# Patient Record
Sex: MALE | Race: OTHER | URBAN - METROPOLITAN AREA
[De-identification: names, ages, dates, MRNs, and addresses within clinical notes are randomized per-mention and may not be internally consistent; named-entity substitution may affect disease eponyms.]

---

## 2018-03-21 ENCOUNTER — EMERGENCY (EMERGENCY)
Facility: HOSPITAL | Age: 53
LOS: 1 days | Discharge: HOME | End: 2018-03-21
Attending: EMERGENCY MEDICINE | Admitting: EMERGENCY MEDICINE
Payer: OTHER MISCELLANEOUS

## 2018-03-21 VITALS
RESPIRATION RATE: 18 BRPM | OXYGEN SATURATION: 99 % | DIASTOLIC BLOOD PRESSURE: 72 MMHG | HEART RATE: 71 BPM | TEMPERATURE: 98 F | SYSTOLIC BLOOD PRESSURE: 158 MMHG

## 2018-03-21 DIAGNOSIS — M54.5 LOW BACK PAIN: ICD-10-CM

## 2018-03-21 DIAGNOSIS — M54.41 LUMBAGO WITH SCIATICA, RIGHT SIDE: ICD-10-CM

## 2018-03-21 DIAGNOSIS — F17.210 NICOTINE DEPENDENCE, CIGARETTES, UNCOMPLICATED: ICD-10-CM

## 2018-03-21 DIAGNOSIS — B20 HUMAN IMMUNODEFICIENCY VIRUS [HIV] DISEASE: ICD-10-CM

## 2018-03-21 PROCEDURE — 99283 EMERGENCY DEPT VISIT LOW MDM: CPT

## 2018-03-21 RX ORDER — DIAZEPAM 5 MG
5 TABLET ORAL ONCE
Qty: 0 | Refills: 0 | Status: DISCONTINUED | OUTPATIENT
Start: 2018-03-21 | End: 2018-03-21

## 2018-03-21 RX ORDER — IBUPROFEN 200 MG
600 TABLET ORAL ONCE
Qty: 0 | Refills: 0 | Status: COMPLETED | OUTPATIENT
Start: 2018-03-21 | End: 2018-03-21

## 2018-03-21 RX ORDER — TRAMADOL HYDROCHLORIDE 50 MG/1
1 TABLET ORAL
Qty: 12 | Refills: 0 | OUTPATIENT
Start: 2018-03-21 | End: 2018-03-23

## 2018-03-21 RX ORDER — IBUPROFEN 200 MG
1 TABLET ORAL
Qty: 30 | Refills: 0 | OUTPATIENT
Start: 2018-03-21

## 2018-03-21 RX ORDER — DIAZEPAM 5 MG
1 TABLET ORAL
Qty: 10 | Refills: 0 | OUTPATIENT
Start: 2018-03-21 | End: 2018-03-25

## 2018-03-21 RX ORDER — DEXAMETHASONE 0.5 MG/5ML
10 ELIXIR ORAL ONCE
Qty: 0 | Refills: 0 | Status: COMPLETED | OUTPATIENT
Start: 2018-03-21 | End: 2018-03-21

## 2018-03-21 RX ORDER — ACETAMINOPHEN 500 MG
975 TABLET ORAL ONCE
Qty: 0 | Refills: 0 | Status: COMPLETED | OUTPATIENT
Start: 2018-03-21 | End: 2018-03-21

## 2018-03-21 RX ADMIN — Medication 5 MILLIGRAM(S): at 13:18

## 2018-03-21 RX ADMIN — Medication 975 MILLIGRAM(S): at 13:18

## 2018-03-21 RX ADMIN — Medication 600 MILLIGRAM(S): at 13:18

## 2018-03-21 RX ADMIN — Medication 10 MILLIGRAM(S): at 13:18

## 2018-03-21 NOTE — ED PROVIDER NOTE - MUSCULOSKELETAL, MLM
Spine appears normal and is not tender, range of motion is not limited, + R sided low back ttp in muscles at ~ L5 level.

## 2018-03-21 NOTE — ED PROVIDER NOTE - OBJECTIVE STATEMENT
52 M her with R sided low back pain and sciatica. Worse with walking. Sometimes feels like his leg is buckling. No midline back pain. Did some stretching with a therapist and it help a bit. Off work today 2/2 snow. Usually works out at the gym with weights and does some cardio. No GIGU sx.

## 2018-03-21 NOTE — ED PROVIDER NOTE - MEDICAL DECISION MAKING DETAILS
Patient presenting with low back pain with sciatica. Seems muscular. will give Decadron/Diazepam and Motrin/APAP and rec bodywork and fu with PMD.

## 2020-03-04 ENCOUNTER — TRANSCRIPTION ENCOUNTER (OUTPATIENT)
Age: 55
End: 2020-03-04

## 2020-06-29 ENCOUNTER — TRANSCRIPTION ENCOUNTER (OUTPATIENT)
Age: 55
End: 2020-06-29

## 2020-08-21 ENCOUNTER — TRANSCRIPTION ENCOUNTER (OUTPATIENT)
Age: 55
End: 2020-08-21

## 2021-03-23 ENCOUNTER — EMERGENCY (EMERGENCY)
Facility: HOSPITAL | Age: 56
LOS: 1 days | Discharge: HOME | End: 2021-03-23
Attending: EMERGENCY MEDICINE | Admitting: EMERGENCY MEDICINE
Payer: COMMERCIAL

## 2021-03-23 VITALS
HEIGHT: 68 IN | TEMPERATURE: 98 F | HEART RATE: 79 BPM | SYSTOLIC BLOOD PRESSURE: 144 MMHG | DIASTOLIC BLOOD PRESSURE: 88 MMHG | RESPIRATION RATE: 18 BRPM | WEIGHT: 169.98 LBS | OXYGEN SATURATION: 98 %

## 2021-03-23 VITALS
TEMPERATURE: 98 F | SYSTOLIC BLOOD PRESSURE: 147 MMHG | OXYGEN SATURATION: 99 % | HEART RATE: 64 BPM | RESPIRATION RATE: 17 BRPM | DIASTOLIC BLOOD PRESSURE: 76 MMHG

## 2021-03-23 DIAGNOSIS — Z20.822 CONTACT WITH AND (SUSPECTED) EXPOSURE TO COVID-19: ICD-10-CM

## 2021-03-23 DIAGNOSIS — H57.12 OCULAR PAIN, LEFT EYE: ICD-10-CM

## 2021-03-23 DIAGNOSIS — L03.213 PERIORBITAL CELLULITIS: ICD-10-CM

## 2021-03-23 DIAGNOSIS — Z21 ASYMPTOMATIC HUMAN IMMUNODEFICIENCY VIRUS [HIV] INFECTION STATUS: ICD-10-CM

## 2021-03-23 PROBLEM — B20 HUMAN IMMUNODEFICIENCY VIRUS [HIV] DISEASE: Chronic | Status: ACTIVE | Noted: 2018-03-21

## 2021-03-23 LAB
ALBUMIN SERPL ELPH-MCNC: 4.2 G/DL — SIGNIFICANT CHANGE UP (ref 3.4–5)
ALP SERPL-CCNC: 80 U/L — SIGNIFICANT CHANGE UP (ref 40–120)
ALT FLD-CCNC: 55 U/L — HIGH (ref 12–42)
ANION GAP SERPL CALC-SCNC: 5 MMOL/L — LOW (ref 9–16)
AST SERPL-CCNC: 35 U/L — SIGNIFICANT CHANGE UP (ref 15–37)
BASOPHILS # BLD AUTO: 0.05 K/UL — SIGNIFICANT CHANGE UP (ref 0–0.2)
BASOPHILS NFR BLD AUTO: 1 % — SIGNIFICANT CHANGE UP (ref 0–2)
BILIRUB SERPL-MCNC: 0.3 MG/DL — SIGNIFICANT CHANGE UP (ref 0.2–1.2)
BUN SERPL-MCNC: 13 MG/DL — SIGNIFICANT CHANGE UP (ref 7–23)
CALCIUM SERPL-MCNC: 9.3 MG/DL — SIGNIFICANT CHANGE UP (ref 8.5–10.5)
CHLORIDE SERPL-SCNC: 107 MMOL/L — SIGNIFICANT CHANGE UP (ref 96–108)
CO2 SERPL-SCNC: 30 MMOL/L — SIGNIFICANT CHANGE UP (ref 22–31)
CREAT SERPL-MCNC: 1 MG/DL — SIGNIFICANT CHANGE UP (ref 0.5–1.3)
EOSINOPHIL # BLD AUTO: 0.19 K/UL — SIGNIFICANT CHANGE UP (ref 0–0.5)
EOSINOPHIL NFR BLD AUTO: 3.7 % — SIGNIFICANT CHANGE UP (ref 0–6)
GLUCOSE SERPL-MCNC: 105 MG/DL — HIGH (ref 70–99)
HCT VFR BLD CALC: 43.1 % — SIGNIFICANT CHANGE UP (ref 39–50)
HGB BLD-MCNC: 15 G/DL — SIGNIFICANT CHANGE UP (ref 13–17)
IMM GRANULOCYTES NFR BLD AUTO: 0.4 % — SIGNIFICANT CHANGE UP (ref 0–1.5)
LYMPHOCYTES # BLD AUTO: 1.09 K/UL — SIGNIFICANT CHANGE UP (ref 1–3.3)
LYMPHOCYTES # BLD AUTO: 21.3 % — SIGNIFICANT CHANGE UP (ref 13–44)
MCHC RBC-ENTMCNC: 33.3 PG — SIGNIFICANT CHANGE UP (ref 27–34)
MCHC RBC-ENTMCNC: 34.8 GM/DL — SIGNIFICANT CHANGE UP (ref 32–36)
MCV RBC AUTO: 95.6 FL — SIGNIFICANT CHANGE UP (ref 80–100)
MONOCYTES # BLD AUTO: 0.61 K/UL — SIGNIFICANT CHANGE UP (ref 0–0.9)
MONOCYTES NFR BLD AUTO: 11.9 % — SIGNIFICANT CHANGE UP (ref 2–14)
NEUTROPHILS # BLD AUTO: 3.15 K/UL — SIGNIFICANT CHANGE UP (ref 1.8–7.4)
NEUTROPHILS NFR BLD AUTO: 61.7 % — SIGNIFICANT CHANGE UP (ref 43–77)
NRBC # BLD: 0 /100 WBCS — SIGNIFICANT CHANGE UP (ref 0–0)
PLATELET # BLD AUTO: 220 K/UL — SIGNIFICANT CHANGE UP (ref 150–400)
POTASSIUM SERPL-MCNC: 4.6 MMOL/L — SIGNIFICANT CHANGE UP (ref 3.5–5.3)
POTASSIUM SERPL-SCNC: 4.6 MMOL/L — SIGNIFICANT CHANGE UP (ref 3.5–5.3)
PROT SERPL-MCNC: 7.7 G/DL — SIGNIFICANT CHANGE UP (ref 6.4–8.2)
RBC # BLD: 4.51 M/UL — SIGNIFICANT CHANGE UP (ref 4.2–5.8)
RBC # FLD: 11.6 % — SIGNIFICANT CHANGE UP (ref 10.3–14.5)
SARS-COV-2 RNA SPEC QL NAA+PROBE: SIGNIFICANT CHANGE UP
SODIUM SERPL-SCNC: 142 MMOL/L — SIGNIFICANT CHANGE UP (ref 132–145)
WBC # BLD: 5.11 K/UL — SIGNIFICANT CHANGE UP (ref 3.8–10.5)
WBC # FLD AUTO: 5.11 K/UL — SIGNIFICANT CHANGE UP (ref 3.8–10.5)

## 2021-03-23 PROCEDURE — 70481 CT ORBIT/EAR/FOSSA W/DYE: CPT | Mod: 26

## 2021-03-23 PROCEDURE — 99285 EMERGENCY DEPT VISIT HI MDM: CPT

## 2021-03-23 RX ORDER — ACETAMINOPHEN 500 MG
975 TABLET ORAL ONCE
Refills: 0 | Status: COMPLETED | OUTPATIENT
Start: 2021-03-23 | End: 2021-03-23

## 2021-03-23 RX ORDER — OFLOXACIN OTIC SOLUTION 3 MG/ML
5 SOLUTION/ DROPS AURICULAR (OTIC)
Qty: 10 | Refills: 0
Start: 2021-03-23 | End: 2021-03-29

## 2021-03-23 RX ORDER — OFLOXACIN 0.3 %
1 DROPS OPHTHALMIC (EYE)
Qty: 15 | Refills: 0
Start: 2021-03-23 | End: 2021-03-29

## 2021-03-23 RX ADMIN — Medication 1 TABLET(S): at 09:40

## 2021-03-23 RX ADMIN — Medication 975 MILLIGRAM(S): at 08:44

## 2021-03-23 NOTE — ED ADULT NURSE REASSESSMENT NOTE - NS ED NURSE REASSESS COMMENT FT1
pt  became diaphoretic, hypotensive, and bradycardic s/p iv insertion, Dr. Khalil made aware, pt placed on cardiac monitor

## 2021-03-23 NOTE — ED PROVIDER NOTE - PATIENT PORTAL LINK FT
You can access the FollowMyHealth Patient Portal offered by [facility] by registering at the following website: [email or web address]. By joining [facility]’s FollowMyHealth portal, you will also be able to view your health information using other applications (apps) compatible with our system.

## 2021-03-23 NOTE — ED PROVIDER NOTE - NSFOLLOWUPINSTRUCTIONS_ED_ALL_ED_FT
Your diagnosis: preseptal cellulitis of the left eye    We performed a CT scan of your left eye which showed eyelid swelling, but no inflammation or infection of your eye or deeper structures.    Discharge instructions:    - Please follow up with an ophthalmologist in the next few days.    - Tylenol up to 650 mg every 8 hours as needed for pain and/or Motrin up to 600 mg every 6 hours as needed for pain. Take any prescribed medications as instructed: augmentin every 12 hours for 7 days. Ofloxacin eye drops every 4 hours for 7 days.    - Be sure to return to the ED if you develop new or worsening symptoms. Specific signs and symptoms to be vigilant of: fever, chills, eye bulging, pain with eye movement, double vision, blurry vision.

## 2021-03-23 NOTE — ED PROVIDER NOTE - NS ED ROS FT
GENERAL: no fever, chills, fatigue, weight loss, night sweats  HEENT: + left eye pain, conjunctivitis, watery discharge, blurry vision,   CARDIAC: no chest pain, palpitations, lightheadedness, syncope  PULM: no dyspnea, wheezing  GI: no abdominal pain, nausea, vomiting, diarrhea, constipation, melena, hematochezia  : no urinary dysuria, frequency, incontinence, hematuria  NEURO: no headache, changes in vision, motor weakness, sensory changes  MSK: no joint pain, joint swelling, myalgias  SKIN: no rashes  HEME: no active bleeding, excessive bruising

## 2021-03-23 NOTE — ED PROVIDER NOTE - CLINICAL SUMMARY MEDICAL DECISION MAKING FREE TEXT BOX
55M with hx of HIV (last checked CD4, viral load 2 weeks prior, normal) presenting with around 1 week of left eye discomfort that progressed to left eyelid swelling, left conjunctivitis associated with watery discharge, blurry vision and discomfort with upper deviation of eye after a an episode of sinusitis that started 2 weeks prior. On exam, VS wnl, afebrile, appears clinically well, diffuse left eyelid swelling, left conjunctivitis with watery discharge, pupils 2mm bilaterally equal and reactive to light, left acuity 20/40, right acuity 20/20, upper deviation of eyes mildly diminished d/t discomfort, full visual fields. Concerning for preseptal cellulitis but cannot r/o orbital cellulitis. Low concern for glaucoma or corneal abrasion. Will check basics, CT orbit with IV contrast, pain control, disposition pending work-up and response to treatment.

## 2021-03-23 NOTE — ED PROVIDER NOTE - OBJECTIVE STATEMENT
55M with hx of HIV (last checked CD4, viral load 2 weeks prior, normal) presenting with around 1 week of left eye discomfort that progressed to left eyelid swelling, left conjunctivitis associated with watery discharge, blurry vision and discomfort with upper deviation of eye after a an episode of sinusitis that started 2 weeks prior. Reports associated mild headache, but denies double vision, fever, chills, nausea, vomiting. No trauma to the eye. Does not remember an inciting incident. Does not wear contacts or glasses.

## 2021-03-23 NOTE — ED ADULT NURSE NOTE - OBJECTIVE STATEMENT
c/o waking up with left eye swelling, denies trauma, denies pain, c/o pressure, no s/s of distress, awaiting provider eval

## 2021-03-23 NOTE — ED ADULT TRIAGE NOTE - CHIEF COMPLAINT QUOTE
Patient to ED with complaint of worsening eye swelling and pressure X 3 days.  Left worse than right.

## 2021-03-23 NOTE — ED PROVIDER NOTE - CARE PROVIDER_API CALL
Zuly Stiles)  Ophthalmology  [address] [place] [address]  Phone: [phone]  Fax: [phone]  Follow Up Time: Urgent

## 2021-03-23 NOTE — ED PROVIDER NOTE - ATTENDING CONTRIBUTION TO CARE
I have seen the pt, reviewed all pertinent clinical data, and I agree with the documentation/care/plan executed by Dr. Khalil.

## 2021-03-23 NOTE — ED PROVIDER NOTE - PHYSICAL EXAMINATION
GENERAL: non-toxic appearing, in NAD  HEAD: atraumatic, normocephalic  EYES: diffuse left eyelid swelling, left conjunctivitis with watery discharge, pupils 2mm bilaterally equal and reactive to light, left acuity 20/40, right acuity 20/20, upper deviation of eyes mildly diminished d/t discomfort, full visual fields  EARS: hearing grossly intact  NOSE: no nasal discharge, epistaxis   CARDIAC: RRR, normal S1S2,  no appreciable murmurs, no cyanosis, cap refill < 2 seconds  PULM: no respiratory distress, oxygen saturation on RA wnl, CTAB, no crackles, rales, rhonchi, or wheezing  GI: abdomen nondistended, soft, nontender, no guarding or rebound tenderness, no palpable masses  NEURO: awake and alert, follows commands, normal speech, PERRLA, EOMI, no focal motor or sensory deficits, normal gait  MSK: spine appears normal, no joint swelling or erythema, no gross deformities of extremities  EXT: no peripheral edema, calf tenderness, redness or swelling  SKIN: warm, dry, and intact, no rashes  PSYCH: appropriate mood and affect

## 2021-03-23 NOTE — ED PROVIDER NOTE - PROGRESS NOTE DETAILS
Tong: CT not showing any signs of orbital cellulitis. Will d/c on augmentin and ofloxacin eye drops.

## 2022-01-04 ENCOUNTER — NON-APPOINTMENT (OUTPATIENT)
Age: 57
End: 2022-01-04

## 2022-01-04 ENCOUNTER — APPOINTMENT (OUTPATIENT)
Dept: OPHTHALMOLOGY | Facility: CLINIC | Age: 57
End: 2022-01-04
Payer: COMMERCIAL

## 2022-01-04 PROCEDURE — 92133 CPTRZD OPH DX IMG PST SGM ON: CPT

## 2022-01-04 PROCEDURE — 92004 COMPRE OPH EXAM NEW PT 1/>: CPT

## 2022-01-04 PROCEDURE — 92020 GONIOSCOPY: CPT

## 2022-01-04 PROCEDURE — 76514 ECHO EXAM OF EYE THICKNESS: CPT

## 2022-08-06 ENCOUNTER — NON-APPOINTMENT (OUTPATIENT)
Age: 57
End: 2022-08-06

## 2023-05-18 PROBLEM — Z00.00 ENCOUNTER FOR PREVENTIVE HEALTH EXAMINATION: Status: ACTIVE | Noted: 2023-05-18
